# Patient Record
Sex: FEMALE | Race: WHITE | Employment: OTHER | URBAN - METROPOLITAN AREA
[De-identification: names, ages, dates, MRNs, and addresses within clinical notes are randomized per-mention and may not be internally consistent; named-entity substitution may affect disease eponyms.]

---

## 2022-07-21 ENCOUNTER — OFFICE VISIT (OUTPATIENT)
Dept: NEUROLOGY | Age: 77
End: 2022-07-21
Payer: MEDICARE

## 2022-07-21 VITALS
HEIGHT: 61 IN | DIASTOLIC BLOOD PRESSURE: 100 MMHG | BODY MASS INDEX: 29.64 KG/M2 | HEART RATE: 85 BPM | WEIGHT: 157 LBS | SYSTOLIC BLOOD PRESSURE: 176 MMHG | RESPIRATION RATE: 16 BRPM | OXYGEN SATURATION: 97 %

## 2022-07-21 DIAGNOSIS — R51.9 NEW ONSET OF HEADACHES AFTER AGE 50: Primary | ICD-10-CM

## 2022-07-21 PROCEDURE — G8427 DOCREV CUR MEDS BY ELIG CLIN: HCPCS | Performed by: PSYCHIATRY & NEUROLOGY

## 2022-07-21 PROCEDURE — G8400 PT W/DXA NO RESULTS DOC: HCPCS | Performed by: PSYCHIATRY & NEUROLOGY

## 2022-07-21 PROCEDURE — 99204 OFFICE O/P NEW MOD 45 MIN: CPT | Performed by: PSYCHIATRY & NEUROLOGY

## 2022-07-21 PROCEDURE — 1090F PRES/ABSN URINE INCON ASSESS: CPT | Performed by: PSYCHIATRY & NEUROLOGY

## 2022-07-21 PROCEDURE — 1101F PT FALLS ASSESS-DOCD LE1/YR: CPT | Performed by: PSYCHIATRY & NEUROLOGY

## 2022-07-21 PROCEDURE — G8536 NO DOC ELDER MAL SCRN: HCPCS | Performed by: PSYCHIATRY & NEUROLOGY

## 2022-07-21 PROCEDURE — G8432 DEP SCR NOT DOC, RNG: HCPCS | Performed by: PSYCHIATRY & NEUROLOGY

## 2022-07-21 PROCEDURE — 1123F ACP DISCUSS/DSCN MKR DOCD: CPT | Performed by: PSYCHIATRY & NEUROLOGY

## 2022-07-21 PROCEDURE — G8417 CALC BMI ABV UP PARAM F/U: HCPCS | Performed by: PSYCHIATRY & NEUROLOGY

## 2022-07-21 RX ORDER — ALENDRONATE SODIUM 70 MG/1
TABLET ORAL
COMMUNITY
Start: 2022-05-19

## 2022-07-21 RX ORDER — CITALOPRAM 20 MG/1
20 TABLET, FILM COATED ORAL DAILY
COMMUNITY

## 2022-07-21 RX ORDER — OXCARBAZEPINE 150 MG/1
150 TABLET, FILM COATED ORAL 2 TIMES DAILY
Qty: 60 TABLET | Refills: 2 | Status: SHIPPED | OUTPATIENT
Start: 2022-07-21 | End: 2022-09-20

## 2022-07-21 NOTE — PROGRESS NOTES
[facility] Neurology Clinics and [address] at [facility] Neurology Clinics at [address] [facility], [address] [facility] [address]  [phone] Office  [phone]           Referring: Carmelita Babinski, [address] [facility] [address]  [address]  [place],  [address]     Chief Complaint   Patient presents with    New Patient    Migraine     Shooting pains in back of head intermittently over the years 8-12 times per mo on avg lasting a few sec to 27min   81-year-old lady who presents today for initial neurologic consultation accompanied by her  for new onset head pain. She notes that she has had for about 8 months pain in the left side of the head localized in the occipital region. It will occur lasting 1 minute or 30 minutes. Described as a flushed burning type feeling. Does not radiate. No sore spot. No injury. No inciting factor. No associated symptoms. No weakness. No loss or alteration in consciousness. No change in vision. Feels a little numb at times. No tingling. She had a fall with hitting her head about 2 years ago but she hit the front of the head. She had the start maybe 2 years ago and had a head CT but it has started to worsen about 8 months ago. Now it is happening daily and can occur 2 or 3 times a day. Sometimes are so intense she has to lay down. She is tried over-the-counter medicine and it does not help. She has not had any advanced imaging.       Past Medical History:   Diagnosis Date    Age related osteoporosis     Essential hypertension     High cholesterol     Other ill-defined conditions(799.89)     EDEMA LEGS       Past Surgical History:   Procedure Laterality Date    HX HYSTERECTOMY      HX UROLOGICAL      BLADDER TACK 2 TIMES    NM BREAST SURGERY PROCEDURE UNLISTED      BI BREAST REDUCTION       Current Outpatient Medications   Medication Sig Dispense Refill alendronate (FOSAMAX) 70 mg tablet       citalopram (CELEXA) 20 mg tablet Take 20 mg by mouth in the morning.      ezetimibe (ZETIA) 10 mg tablet Take  by mouth.      calcium-vitamin D (OS-ENRIQUE +D3) 500 mg-200 unit per tablet Take 1 Tab by mouth two (2) times daily (with meals). No Known Allergies    Social History     Tobacco Use    Smoking status: Never    Smokeless tobacco: Never   Vaping Use    Vaping Use: Never used   Substance Use Topics    Alcohol use: No    Drug use: Never       Family History   Problem Relation Age of Onset    Stroke Mother     Heart Disease Mother     Heart Disease Father        Review of Systems  Pertinent positives and negatives as noted. Examination  Visit Vitals  BP (!) 160/100 (BP 1 Location: Left upper arm, BP Patient Position: Sitting, BP Cuff Size: Adult)   Pulse 85   Resp 16   Ht 5' 1\" (1.549 m)   Wt 71.2 kg (157 lb)   SpO2 97%   BMI 29.66 kg/m²     Neurologically, she is awake, alert, and oriented with normal speech and language. Her cognition is normal.    Intact cranial nerves 2-12. No nystagmus. Disk margins are flat bilaterally. She has normal bulk and tone. She has no abnormal movement. She has no pronation or drift. She generates full strength in the upper and lower extremities to direct confrontational testing. Reflexes are symmetrical in the upper and lower extremities bilaterally. No pathologic reflexes are elicited. Finger nose finger and rapid alternating movements are normal.  Steady gait.    She is tender over the left occipital notch and this is reminiscent of the pain of which she complains      Impression/Plan  29-year-old lady with new worsening left occipital pain and this is reminiscent of occipital neuralgia although she is not describing an electric-like pain and it does not radiate forward but she is tender over the occipital notch  Given the inconsistencies MRI of the brain and will get a carotid Doppler  We will give her a trial of Trileptal 150 mg twice daily  Follow-up in our [place] office in about 8 weeks which is closer to [place] in [place]          This note was created using voice recognition software. Despite editing, there may be syntax errors.

## 2022-07-21 NOTE — PATIENT INSTRUCTIONS
RESULT POLICY      If we have ordered testing for you, know that; \"NO NEWS IS GOOD NEWS! \"     It is our policy that we no longer call patients with results, nor do we  give test results over the phone. We schedule follow up appointments so that your results can be discussed in person. This allows you to address any questions you have regarding the results. If you choose to go to an imaging center outside of [facility], it is your responsibility to bring imaging report and disc to follow up appointment. If something of concern is revealed on your test, we will contact you to discuss the matter and if needed schedule a sooner follow up appointment. Additionally, results may be found by using the My Chart feature and one of our patient service representatives at the  can give you instructions on how to access this feature to utilize our electronic medical record system. Thank you for your understanding. [address] [facility] Neu[facility]   Statement to Patients  April 1, 2014      In an effort to ensure the large volume of patient prescription refills is processed in the most efficient and expeditious manner, we are asking our patients to assist us by calling your Pharmacy for all prescription refills, this will include also your  Mail Order Pharmacy. The pharmacy will contact our office electronically to continue the refill process. Please do not wait until the last minute to call your pharmacy. We need at least 48 hours (2days) to fill prescriptions. We also encourage you to call your pharmacy before going to  your prescription to make sure it is ready. With regard to controlled substance prescription refill requests (narcotic refills) that need to be picked up at our office, we ask your cooperation by providing us with at least 72 hours (3days) notice that you will need a refill.     We will not refill narcotic prescription refill requests after 4:00pm on any weekday, Monday through Thursday, or after 2:00pm on Fridays, or on the weekends. We encourage everyone to explore another way of getting your prescription refill request processed using [email or web address], our patient web portal through our electronic medical record system. [email or web address] is an efficient and effective way to communicate your medication request directly to the office and  downloadable as an jennifer on your smart phone . [email or web address] also features a review functionality that allows you to view your medication list as well as leave messages for your physician. Are you ready to get connected? If so please review the attatched instructions or speak to any of our staff to get you set up right away! Thank you so much for your cooperation. Should you have any questions please contact our Practice Administrator.

## 2022-08-09 ENCOUNTER — HOSPITAL ENCOUNTER (OUTPATIENT)
Dept: MRI IMAGING | Age: 77
Discharge: HOME | End: 2022-08-09
Attending: PSYCHIATRY & NEUROLOGY
Payer: MEDICARE

## 2022-08-09 DIAGNOSIS — R51.9 NEW ONSET OF HEADACHES AFTER AGE 50: ICD-10-CM

## 2022-08-09 PROCEDURE — 70551 MRI BRAIN STEM W/O DYE: CPT

## 2022-09-20 ENCOUNTER — OFFICE VISIT (OUTPATIENT)
Dept: NEUROLOGY | Age: 77
End: 2022-09-20
Payer: MEDICARE

## 2022-09-20 VITALS
OXYGEN SATURATION: 99 % | SYSTOLIC BLOOD PRESSURE: 138 MMHG | DIASTOLIC BLOOD PRESSURE: 78 MMHG | HEART RATE: 80 BPM | RESPIRATION RATE: 14 BRPM

## 2022-09-20 DIAGNOSIS — R51.9 NEW ONSET OF HEADACHES AFTER AGE 50: Primary | ICD-10-CM

## 2022-09-20 PROCEDURE — G8427 DOCREV CUR MEDS BY ELIG CLIN: HCPCS | Performed by: PSYCHIATRY & NEUROLOGY

## 2022-09-20 PROCEDURE — G8536 NO DOC ELDER MAL SCRN: HCPCS | Performed by: PSYCHIATRY & NEUROLOGY

## 2022-09-20 PROCEDURE — 1101F PT FALLS ASSESS-DOCD LE1/YR: CPT | Performed by: PSYCHIATRY & NEUROLOGY

## 2022-09-20 PROCEDURE — G8510 SCR DEP NEG, NO PLAN REQD: HCPCS | Performed by: PSYCHIATRY & NEUROLOGY

## 2022-09-20 PROCEDURE — G8417 CALC BMI ABV UP PARAM F/U: HCPCS | Performed by: PSYCHIATRY & NEUROLOGY

## 2022-09-20 PROCEDURE — 1123F ACP DISCUSS/DSCN MKR DOCD: CPT | Performed by: PSYCHIATRY & NEUROLOGY

## 2022-09-20 PROCEDURE — 99214 OFFICE O/P EST MOD 30 MIN: CPT | Performed by: PSYCHIATRY & NEUROLOGY

## 2022-09-20 PROCEDURE — G8400 PT W/DXA NO RESULTS DOC: HCPCS | Performed by: PSYCHIATRY & NEUROLOGY

## 2022-09-20 PROCEDURE — 1090F PRES/ABSN URINE INCON ASSESS: CPT | Performed by: PSYCHIATRY & NEUROLOGY

## 2022-09-20 RX ORDER — GABAPENTIN 300 MG/1
300 CAPSULE ORAL 2 TIMES DAILY
Qty: 60 CAPSULE | Refills: 3 | Status: SHIPPED | OUTPATIENT
Start: 2022-09-20

## 2022-09-20 NOTE — PROGRESS NOTES
Chief Complaint   Patient presents with    Follow-up     Patient returns after starting trileptal and she states the pain is not any better. Patient is accompanied by her .

## 2022-09-20 NOTE — PROGRESS NOTES
[facility] Neurology Clinics and [address] at [facility] Neurology Clinics at [address] [facility], [address] [facility] [address] [facility], [address] [place] [address]   [phone]              No chief complaint on file. Current Outpatient Medications   Medication Sig Dispense Refill    alendronate (FOSAMAX) 70 mg tablet       citalopram (CELEXA) 20 mg tablet Take 20 mg by mouth in the morning. OXcarbazepine (TRILEPTAL) 150 mg tablet Take 1 Tablet by mouth two (2) times a day. 60 Tablet 2    ezetimibe (ZETIA) 10 mg tablet Take  by mouth.      calcium-vitamin D (OS-ENRIQUE +D3) 500 mg-200 unit per tablet Take 1 Tab by mouth two (2) times daily (with meals). No Known Allergies  Social History     Tobacco Use    Smoking status: Never    Smokeless tobacco: Never   Vaping Use    Vaping Use: Never used   Substance Use Topics    Alcohol use: No    Drug use: Never     29-year-old lady following up from recent visit with me in July for left-sided occipital neuralgia type symptom  We did send her for MRI of the brain and that was personally reviewed and unremarkable  Carotid Doppler unremarkable  We started her on Trileptal for symptomatic relief  Today she reports the Trileptal did not help. She still getting sharp shooting pains in the left occipital region. She believes it to be worse. No focal complaints. Examination  There were no vitals taken for this visit. Visit Vitals  /78   Pulse 80   Resp 14   SpO2 99%     Pleasant engaging lady. She is awake alert and oriented. She has normal speech and language. Normal cognition.     Impression/Plan  Left-sided occipital pain reminiscent of trigeminal neuralgia with no improvement on Trileptal and with increasing symptoms  We will give her a trial of Neurontin 300 mg twice daily  She will let me know in about 6 weeks by either the patient portal or by telephone call how she is doing and then we will make adjustment and schedule follow-up      Lucinda Bowers MD        This note was created using voice recognition software. Despite editing, there may be syntax errors.

## 2022-12-05 DIAGNOSIS — R51.9 NEW ONSET OF HEADACHES AFTER AGE 50: ICD-10-CM

## 2022-12-05 NOTE — TELEPHONE ENCOUNTER
Patient calling about medication (Gabapentin)    Requested Prescriptions     Pending Prescriptions Disp Refills    gabapentin (NEURONTIN) 300 mg capsule 60 Capsule 3     Sig: Take 1 Capsule by mouth two (2) times a day. Max Daily Amount: 600 mg.      Please send refill request to:    [address]: [phone]

## 2022-12-08 RX ORDER — GABAPENTIN 300 MG/1
300 CAPSULE ORAL 2 TIMES DAILY
Qty: 180 CAPSULE | Refills: 0 | Status: SHIPPED | OUTPATIENT
Start: 2022-12-08

## 2023-03-23 DIAGNOSIS — R51.9 NEW ONSET OF HEADACHES AFTER AGE 50: ICD-10-CM

## 2023-03-23 RX ORDER — GABAPENTIN 300 MG/1
CAPSULE ORAL
Qty: 180 CAPSULE | Refills: 1 | Status: SHIPPED | OUTPATIENT
Start: 2023-03-23

## 2023-05-24 RX ORDER — GABAPENTIN 300 MG/1
CAPSULE ORAL
COMMUNITY
Start: 2023-03-23

## 2023-05-24 RX ORDER — ALENDRONATE SODIUM 70 MG/1
TABLET ORAL
COMMUNITY
Start: 2022-05-19

## 2023-05-24 RX ORDER — EZETIMIBE 10 MG/1
TABLET ORAL
COMMUNITY

## 2023-05-24 RX ORDER — CITALOPRAM 20 MG/1
20 TABLET ORAL DAILY
COMMUNITY

## 2023-05-24 RX ORDER — B-COMPLEX WITH VITAMIN C
1 TABLET ORAL 2 TIMES DAILY WITH MEALS
COMMUNITY

## 2023-12-11 ENCOUNTER — TELEPHONE (OUTPATIENT)
Age: 78
End: 2023-12-11

## 2023-12-11 NOTE — TELEPHONE ENCOUNTER
Pt has not been seen since 2022 with no upcoming appt.  LVM with pt that we can't fill this until she has upcoming appt.

## 2023-12-11 NOTE — TELEPHONE ENCOUNTER
Patient calling to refill gabapentin 300MG twice a day. She would like it sent to [facility] pharmacy. [address], [address], [place]     Pharmacy Phone: [phone]

## 2023-12-12 DIAGNOSIS — G44.89 OTHER HEADACHE SYNDROME: Primary | ICD-10-CM

## 2023-12-12 RX ORDER — GABAPENTIN 300 MG/1
CAPSULE ORAL
Qty: 180 CAPSULE | Refills: 1 | Status: SHIPPED | OUTPATIENT
Start: 2023-12-12 | End: 2024-03-11

## 2024-03-19 ENCOUNTER — OFFICE VISIT (OUTPATIENT)
Age: 79
End: 2024-03-19
Payer: MEDICARE

## 2024-03-19 VITALS
WEIGHT: 145 LBS | RESPIRATION RATE: 16 BRPM | DIASTOLIC BLOOD PRESSURE: 84 MMHG | BODY MASS INDEX: 27.38 KG/M2 | SYSTOLIC BLOOD PRESSURE: 136 MMHG | HEIGHT: 61 IN | OXYGEN SATURATION: 99 % | HEART RATE: 77 BPM

## 2024-03-19 DIAGNOSIS — G44.89 OTHER HEADACHE SYNDROME: ICD-10-CM

## 2024-03-19 PROCEDURE — 1123F ACP DISCUSS/DSCN MKR DOCD: CPT | Performed by: PSYCHIATRY & NEUROLOGY

## 2024-03-19 PROCEDURE — 1090F PRES/ABSN URINE INCON ASSESS: CPT | Performed by: PSYCHIATRY & NEUROLOGY

## 2024-03-19 PROCEDURE — 1036F TOBACCO NON-USER: CPT | Performed by: PSYCHIATRY & NEUROLOGY

## 2024-03-19 PROCEDURE — G8400 PT W/DXA NO RESULTS DOC: HCPCS | Performed by: PSYCHIATRY & NEUROLOGY

## 2024-03-19 PROCEDURE — G8484 FLU IMMUNIZE NO ADMIN: HCPCS | Performed by: PSYCHIATRY & NEUROLOGY

## 2024-03-19 PROCEDURE — 99213 OFFICE O/P EST LOW 20 MIN: CPT | Performed by: PSYCHIATRY & NEUROLOGY

## 2024-03-19 PROCEDURE — G8427 DOCREV CUR MEDS BY ELIG CLIN: HCPCS | Performed by: PSYCHIATRY & NEUROLOGY

## 2024-03-19 PROCEDURE — G8419 CALC BMI OUT NRM PARAM NOF/U: HCPCS | Performed by: PSYCHIATRY & NEUROLOGY

## 2024-03-19 RX ORDER — GABAPENTIN 300 MG/1
CAPSULE ORAL
Qty: 180 CAPSULE | Refills: 1 | Status: SHIPPED | OUTPATIENT
Start: 2024-03-19 | End: 2027-03-19

## 2024-03-19 ASSESSMENT — PATIENT HEALTH QUESTIONNAIRE - PHQ9
SUM OF ALL RESPONSES TO PHQ QUESTIONS 1-9: 1
SUM OF ALL RESPONSES TO PHQ9 QUESTIONS 1 & 2: 1
SUM OF ALL RESPONSES TO PHQ QUESTIONS 1-9: 1
SUM OF ALL RESPONSES TO PHQ QUESTIONS 1-9: 1
1. LITTLE INTEREST OR PLEASURE IN DOING THINGS: NOT AT ALL
SUM OF ALL RESPONSES TO PHQ QUESTIONS 1-9: 1
2. FEELING DOWN, DEPRESSED OR HOPELESS: SEVERAL DAYS

## 2024-03-19 NOTE — PROGRESS NOTES
pronation or drift. Resists fully in all 4 extrems.  DTR symmetric in all 4 extremities.  No ataxia. Steady gait.      Impression/Plan  Left-sided occipital neuralgia tolerable with Neurontin 300 mg twice daily  Continue same Neurontin  As long as she does well follow-up 1 year    Danielle Shin MD        This note was created using voice recognition software. Despite editing, there may be syntax errors.

## 2025-01-14 DIAGNOSIS — G44.89 OTHER HEADACHE SYNDROME: ICD-10-CM

## 2025-01-14 RX ORDER — GABAPENTIN 300 MG/1
CAPSULE ORAL
Qty: 180 CAPSULE | Refills: 1 | Status: SHIPPED | OUTPATIENT
Start: 2025-01-14 | End: 2027-01-13

## 2025-01-15 RX ORDER — GABAPENTIN 300 MG/1
CAPSULE ORAL
Qty: 180 CAPSULE | Refills: 0 | Status: SHIPPED | OUTPATIENT
Start: 2025-01-15 | End: 2028-01-14

## 2025-03-25 ENCOUNTER — OFFICE VISIT (OUTPATIENT)
Age: 80
End: 2025-03-25
Payer: MEDICARE

## 2025-03-25 VITALS
HEART RATE: 80 BPM | DIASTOLIC BLOOD PRESSURE: 93 MMHG | OXYGEN SATURATION: 96 % | RESPIRATION RATE: 16 BRPM | SYSTOLIC BLOOD PRESSURE: 171 MMHG

## 2025-03-25 DIAGNOSIS — G44.89 OTHER HEADACHE SYNDROME: ICD-10-CM

## 2025-03-25 PROCEDURE — G8428 CUR MEDS NOT DOCUMENT: HCPCS | Performed by: PSYCHIATRY & NEUROLOGY

## 2025-03-25 PROCEDURE — 1090F PRES/ABSN URINE INCON ASSESS: CPT | Performed by: PSYCHIATRY & NEUROLOGY

## 2025-03-25 PROCEDURE — 1123F ACP DISCUSS/DSCN MKR DOCD: CPT | Performed by: PSYCHIATRY & NEUROLOGY

## 2025-03-25 PROCEDURE — 1036F TOBACCO NON-USER: CPT | Performed by: PSYCHIATRY & NEUROLOGY

## 2025-03-25 PROCEDURE — G8400 PT W/DXA NO RESULTS DOC: HCPCS | Performed by: PSYCHIATRY & NEUROLOGY

## 2025-03-25 PROCEDURE — G8421 BMI NOT CALCULATED: HCPCS | Performed by: PSYCHIATRY & NEUROLOGY

## 2025-03-25 PROCEDURE — 1126F AMNT PAIN NOTED NONE PRSNT: CPT | Performed by: PSYCHIATRY & NEUROLOGY

## 2025-03-25 PROCEDURE — 99213 OFFICE O/P EST LOW 20 MIN: CPT | Performed by: PSYCHIATRY & NEUROLOGY

## 2025-03-25 RX ORDER — GABAPENTIN 300 MG/1
CAPSULE ORAL
Qty: 180 CAPSULE | Refills: 1 | Status: SHIPPED | OUTPATIENT
Start: 2025-03-25 | End: 2028-03-23

## 2025-03-25 NOTE — PROGRESS NOTES
[facility] Neurology Clinics and Neuro[facility] at [facility] Neurology Clinics at [address] [place] [address] [address] [facility] [address] [place] [address] [phone]              Chief Complaint   Patient presents with    Left-sided occipital neuralgia      Annual f/ up // Neurontin 300 mg twice daily - only taking once daily     Current Outpatient Medications   Medication Sig Dispense Refill    gabapentin (NEURONTIN) 300 MG capsule TAKE 1 CAPSULE TWICE DAILY (MAX DAILY AMOUNT: 600 MG) (Patient taking differently: Take 1 capsule by mouth daily. TAKE 1 CAPSULE TWICE DAILY (MAX DAILY AMOUNT: 600 MG)) 180 capsule 0    alendronate (FOSAMAX) 70 MG tablet every 7 days ceived the following from [facility]: Outside name: alendronate (FOSAMAX) 70 mg tablet      Calcium Carbonate-Vitamin D (OYSTER SHELL CALCIUM/D) 500-5 MG-MCG TABS Take 1 tablet by mouth 2 times daily (with meals)      citalopram (CELEXA) 20 MG tablet Take 1 tablet by mouth daily      ezetimibe (ZETIA) 10 MG tablet Take 1 tablet by mouth daily       No current facility-administered medications for this visit.      No Known Allergies  Social History     Tobacco Use    Smoking status: Never    Smokeless tobacco: Never   Vaping Use    Vaping status: Never Used   Substance Use Topics    Alcohol use: No    Drug use: Never       History of Present Illness  79-year-old lady following up today for left sided occipital neuralgia.  She was maintained on Neurontin 300 mg twice daily.  Today she reports her symptoms have been well-controlled and she is down to 1 Neurontin daily.  She has had a couple of bouts of pain but otherwise she is very happy with how she is doing.      Examination  BP (!) 164/93 (BP Site: Left Upper Arm, Patient Position: Sitting)   Pulse 80   Resp 16   SpO2 96%     Awake, alert and oriented.  No icterus.  CN intact 2-12 without nystagmus.  No

## 2025-03-25 NOTE — PROGRESS NOTES
BP elevated x2.  Advised pt to monitor BP at home for the next couple of weeks.  Discuss BP with pcp if it continues to run >140/90.   CHECK ONBASE FOR SCAN

## 2025-04-24 ENCOUNTER — HOSPITAL ENCOUNTER (OUTPATIENT)
Age: 80
Setting detail: SPECIMEN
Discharge: HOME | End: 2025-04-27
Payer: MEDICARE

## 2025-04-24 ENCOUNTER — OFFICE VISIT (OUTPATIENT)
Age: 80
End: 2025-04-24
Payer: MEDICARE

## 2025-04-24 ENCOUNTER — HOSPITAL ENCOUNTER (OUTPATIENT)
Age: 80
Discharge: HOME | End: 2025-04-27
Payer: MEDICARE

## 2025-04-24 VITALS — BODY MASS INDEX: 28.32 KG/M2 | HEIGHT: 61 IN | WEIGHT: 150 LBS

## 2025-04-24 DIAGNOSIS — E78.00 HIGH CHOLESTEROL: ICD-10-CM

## 2025-04-24 DIAGNOSIS — M17.12 OSTEOARTHRITIS OF LEFT KNEE, UNSPECIFIED OSTEOARTHRITIS TYPE: ICD-10-CM

## 2025-04-24 DIAGNOSIS — M25.562 LEFT KNEE PAIN, UNSPECIFIED CHRONICITY: Primary | ICD-10-CM

## 2025-04-24 LAB
ANION GAP SERPL CALC-SCNC: 4 MMOL/L (ref 3–18)
BUN SERPL-MCNC: 21 MG/DL (ref 7–18)
BUN/CREAT SERPL: 27 (ref 12–20)
CA-I BLD-MCNC: 9.5 MG/DL (ref 8.5–10.1)
CHLORIDE SERPL-SCNC: 103 MMOL/L (ref 100–111)
CO2 SERPL-SCNC: 32 MMOL/L (ref 21–32)
CREAT SERPL-MCNC: 0.78 MG/DL (ref 0.6–1.3)
ERYTHROCYTE [DISTWIDTH] IN BLOOD BY AUTOMATED COUNT: 12.2 % (ref 11.6–14.5)
GLUCOSE SERPL-MCNC: 93 MG/DL (ref 74–99)
HCT VFR BLD AUTO: 36.9 % (ref 35–45)
HGB BLD-MCNC: 11.8 G/DL (ref 12–16)
MCH RBC QN AUTO: 31.3 PG (ref 24–34)
MCHC RBC AUTO-ENTMCNC: 32 G/DL (ref 31–37)
MCV RBC AUTO: 97.9 FL (ref 78–100)
NRBC # BLD: 0 K/UL (ref 0–0.01)
NRBC BLD-RTO: 0 PER 100 WBC
PLATELET # BLD AUTO: 265 K/UL (ref 135–420)
PMV BLD AUTO: 9.3 FL (ref 9.2–11.8)
POTASSIUM SERPL-SCNC: 3.9 MMOL/L (ref 3.5–5.5)
RBC # BLD AUTO: 3.77 M/UL (ref 4.2–5.3)
SODIUM SERPL-SCNC: 139 MMOL/L (ref 136–145)
WBC # BLD AUTO: 8 K/UL (ref 4.6–13.2)

## 2025-04-24 PROCEDURE — 85027 COMPLETE CBC AUTOMATED: CPT

## 2025-04-24 PROCEDURE — 36415 COLL VENOUS BLD VENIPUNCTURE: CPT

## 2025-04-24 PROCEDURE — G8419 CALC BMI OUT NRM PARAM NOF/U: HCPCS | Performed by: ORTHOPAEDIC SURGERY

## 2025-04-24 PROCEDURE — G8427 DOCREV CUR MEDS BY ELIG CLIN: HCPCS | Performed by: ORTHOPAEDIC SURGERY

## 2025-04-24 PROCEDURE — 71046 X-RAY EXAM CHEST 2 VIEWS: CPT

## 2025-04-24 PROCEDURE — 1123F ACP DISCUSS/DSCN MKR DOCD: CPT | Performed by: ORTHOPAEDIC SURGERY

## 2025-04-24 PROCEDURE — 1160F RVW MEDS BY RX/DR IN RCRD: CPT | Performed by: ORTHOPAEDIC SURGERY

## 2025-04-24 PROCEDURE — 1159F MED LIST DOCD IN RCRD: CPT | Performed by: ORTHOPAEDIC SURGERY

## 2025-04-24 PROCEDURE — 80048 BASIC METABOLIC PNL TOTAL CA: CPT

## 2025-04-24 PROCEDURE — 1090F PRES/ABSN URINE INCON ASSESS: CPT | Performed by: ORTHOPAEDIC SURGERY

## 2025-04-24 PROCEDURE — 93005 ELECTROCARDIOGRAM TRACING: CPT

## 2025-04-24 PROCEDURE — G8400 PT W/DXA NO RESULTS DOC: HCPCS | Performed by: ORTHOPAEDIC SURGERY

## 2025-04-24 PROCEDURE — 1036F TOBACCO NON-USER: CPT | Performed by: ORTHOPAEDIC SURGERY

## 2025-04-24 PROCEDURE — 99204 OFFICE O/P NEW MOD 45 MIN: CPT | Performed by: ORTHOPAEDIC SURGERY

## 2025-04-24 PROCEDURE — 1125F AMNT PAIN NOTED PAIN PRSNT: CPT | Performed by: ORTHOPAEDIC SURGERY

## 2025-04-24 RX ORDER — FENOFIBRATE 134 MG/1
CAPSULE ORAL
COMMUNITY

## 2025-04-24 RX ORDER — DENOSUMAB 60 MG/ML
INJECTION SUBCUTANEOUS
COMMUNITY

## 2025-04-24 RX ORDER — ATORVASTATIN CALCIUM 10 MG/1
TABLET, FILM COATED ORAL
COMMUNITY

## 2025-04-24 RX ORDER — CIPROFLOXACIN 500 MG/1
TABLET, FILM COATED ORAL
COMMUNITY
Start: 2024-11-04

## 2025-04-24 RX ORDER — NITROFURANTOIN 25; 75 MG/1; MG/1
CAPSULE ORAL
COMMUNITY
Start: 2024-10-24

## 2025-04-24 RX ORDER — BUSPIRONE HYDROCHLORIDE 15 MG/1
TABLET ORAL
COMMUNITY
Start: 2024-07-17

## 2025-04-24 RX ORDER — METHYLDOPA/HYDROCHLOROTHIAZIDE 250MG-15MG
TABLET ORAL
COMMUNITY

## 2025-04-24 NOTE — PROGRESS NOTES
received. Diagnosed with arthritis by an orthopedic specialist. No history of blood clots. Consulted a cardiologist once, no ongoing cardiac issues.    SOCIAL HISTORY  - Marital Status:     Results  Imaging   - X-ray of the left knee 3 views: Severe arthritis, bone on bone arthritis    Assessment & Plan  1. Severe osteoarthritis of the left knee:  X-rays show severe bone-on-bone arthritis. Significant pain and worsening symptoms over 6 months.    Discussed Jiffy knee replacement benefits, including quicker rehabilitation. Surgery duration: ~1 hour, same-day discharge. Local physical therapy. Risks: bleeding, infection, blood clots. Placed on cancellation list for expedited surgery. Preoperative clearance (blood work, chest x-ray, EKG) today at the hospital.    Follow-up: Placement on cancellation list for expedited surgery.    As part of continued conservative pain management options the patient was advised to utilize Tylenol or OTC NSAIDS as long as it is not medically contraindicated.   Return to Office:    Follow-up and Dispositions    Return for SCHEDULE FOR SURGERY.        Scribed by Abdulaizz James MD as dictated by Abdulaziz James MD.  Documentation, performed by, True and Accepted Abdulaziz James MD    The patient (or guardian, if applicable) and other individuals in attendance with the patient were advised that Artificial Intelligence will be utilized during this visit to record, process the conversation to generate a clinical note, and support improvement of the AI technology. The patient (or guardian, if applicable) and other individuals in attendance at the appointment consented to the use of AI, including the recording.

## 2025-04-25 LAB
BACTERIA SPEC CULT: NORMAL
BACTERIA SPEC CULT: NORMAL
EKG ATRIAL RATE: 85 BPM
EKG DIAGNOSIS: NORMAL
EKG P AXIS: 53 DEGREES
EKG P-R INTERVAL: 168 MS
EKG Q-T INTERVAL: 372 MS
EKG QRS DURATION: 88 MS
EKG QTC CALCULATION (BAZETT): 442 MS
EKG R AXIS: 31 DEGREES
EKG T AXIS: 61 DEGREES
EKG VENTRICULAR RATE: 85 BPM
Lab: NORMAL

## 2025-05-28 DIAGNOSIS — M17.12 OSTEOARTHRITIS OF LEFT KNEE, UNSPECIFIED OSTEOARTHRITIS TYPE: Primary | ICD-10-CM

## 2025-06-04 DIAGNOSIS — Z96.652 STATUS POST TOTAL LEFT KNEE REPLACEMENT: Primary | ICD-10-CM

## 2025-06-12 ENCOUNTER — OFFICE VISIT (OUTPATIENT)
Age: 80
End: 2025-06-12
Payer: MEDICARE

## 2025-06-12 DIAGNOSIS — M17.12 OSTEOARTHRITIS OF LEFT KNEE, UNSPECIFIED OSTEOARTHRITIS TYPE: Primary | ICD-10-CM

## 2025-06-12 DIAGNOSIS — E78.00 HIGH CHOLESTEROL: ICD-10-CM

## 2025-06-12 PROCEDURE — 99214 OFFICE O/P EST MOD 30 MIN: CPT | Performed by: ORTHOPAEDIC SURGERY

## 2025-06-12 PROCEDURE — 1090F PRES/ABSN URINE INCON ASSESS: CPT | Performed by: ORTHOPAEDIC SURGERY

## 2025-06-12 PROCEDURE — G8419 CALC BMI OUT NRM PARAM NOF/U: HCPCS | Performed by: ORTHOPAEDIC SURGERY

## 2025-06-12 PROCEDURE — 1036F TOBACCO NON-USER: CPT | Performed by: ORTHOPAEDIC SURGERY

## 2025-06-12 PROCEDURE — 1123F ACP DISCUSS/DSCN MKR DOCD: CPT | Performed by: ORTHOPAEDIC SURGERY

## 2025-06-12 PROCEDURE — G8400 PT W/DXA NO RESULTS DOC: HCPCS | Performed by: ORTHOPAEDIC SURGERY

## 2025-06-12 PROCEDURE — G8427 DOCREV CUR MEDS BY ELIG CLIN: HCPCS | Performed by: ORTHOPAEDIC SURGERY

## 2025-06-12 RX ORDER — ASPIRIN 325 MG
325 TABLET ORAL 2 TIMES DAILY
Qty: 60 TABLET | Refills: 0 | Status: SHIPPED | OUTPATIENT
Start: 2025-06-12

## 2025-06-12 RX ORDER — CEPHALEXIN 500 MG/1
500 CAPSULE ORAL EVERY 8 HOURS
Qty: 21 CAPSULE | Refills: 0 | Status: SHIPPED | OUTPATIENT
Start: 2025-06-12 | End: 2025-06-19

## 2025-06-12 RX ORDER — ONDANSETRON 8 MG/1
8 TABLET, ORALLY DISINTEGRATING ORAL EVERY 8 HOURS PRN
Qty: 20 TABLET | Refills: 0 | Status: SHIPPED | OUTPATIENT
Start: 2025-06-12

## 2025-06-12 RX ORDER — OXYCODONE AND ACETAMINOPHEN 5; 325 MG/1; MG/1
1 TABLET ORAL
Qty: 30 TABLET | Refills: 0 | Status: SHIPPED | OUTPATIENT
Start: 2025-06-12 | End: 2025-06-19

## 2025-06-12 NOTE — PROGRESS NOTES
Name: Lisandra Gil    : 1945     [facility] ORTHOPAEDICS AND SPORTS MEDICINE  [address] [facility] [address], [address]  [facility] [address]  Dept: [phone]  Dept Fax: [phone]   Chief Complaint   Patient presents with    Pre-op Exam    Knee Pain     There were no vitals taken for this visit.   No Known Allergies  Current Outpatient Medications   Medication Sig Dispense Refill    oxyCODONE-acetaminophen (PERCOCET) 5-325 MG per tablet Take 1 tablet by mouth every 4-6 hours as needed for Pain for up to 7 days. Do not start taking pain medication until after surgery! Max Daily Amount: 6 tablets 30 tablet 0    ondansetron (ZOFRAN-ODT) 8 MG TBDP disintegrating tablet Take 1 tablet by mouth every 8 hours as needed for Nausea or Vomiting TAKE EVERY 8 HOURS PRN NAUSEA / Do Not start until after surgery / dispense 20 tabs 20 tablet 0    aspirin 325 MG tablet Take 1 tablet by mouth in the morning and at bedtime DO NOT START UNTIL AFTER SURGERY / NO 90 DAY RX WILL BE PROVIDED AS PATIENT WILL ONLY TAKE THIS 30 DAYS POST SURGERY 60 tablet 0    cephALEXin (KEFLEX) 500 MG capsule Take 1 capsule by mouth every 8 (eight) hours for 7 days DO NOT START MEDICATION UNTIL AFTER SURGERY 21 capsule 0    atorvastatin (LIPITOR) 10 MG tablet 10 MG  , TAKE 1 TABLET ONE TIME DAILY      busPIRone (BUSPAR) 15 MG tablet Take by mouth      ciprofloxacin (CIPRO) 500 MG tablet Take by mouth      denosumab (PROLIA) 60 MG/ML SOSY SC injection Inject into the skin      esomeprazole (NEXIUM) 20 MG delayed release capsule Take by mouth      fenofibrate micronized (LOFIBRA) 134 MG capsule Take by mouth      nitrofurantoin, macrocrystal-monohydrate, (MACROBID) 100 MG capsule Take by mouth      Probiotic CHEW Take by mouth      gabapentin (NEURONTIN) 300 MG capsule TAKE 1 CAPSULE TWICE DAILY (MAX DAILY AMOUNT: 600 MG) 180 capsule 1    citalopram (CELEXA) 20 MG tablet Take 1 tablet by mouth daily

## 2025-07-02 ENCOUNTER — HOSPITAL ENCOUNTER (OUTPATIENT)
Age: 80
Discharge: HOME | End: 2025-07-05
Payer: MEDICARE

## 2025-07-02 DIAGNOSIS — M17.12 ARTHRITIS OF LEFT KNEE: ICD-10-CM

## 2025-07-02 PROCEDURE — 73560 X-RAY EXAM OF KNEE 1 OR 2: CPT

## 2025-07-09 ENCOUNTER — TELEMEDICINE (OUTPATIENT)
Age: 80
End: 2025-07-09

## 2025-07-09 DIAGNOSIS — Z96.652 STATUS POST TOTAL KNEE REPLACEMENT, LEFT: Primary | ICD-10-CM

## 2025-07-09 DIAGNOSIS — M25.562 LEFT KNEE PAIN, UNSPECIFIED CHRONICITY: ICD-10-CM

## 2025-07-09 PROCEDURE — 99024 POSTOP FOLLOW-UP VISIT: CPT

## 2025-07-09 NOTE — PROGRESS NOTES
Name: Lisandra Gil (:  1945) is a Established patient, presenting virtually for evaluation of the following:      [facility] ORTHOPAEDICS AND SPORTS MEDICINE  [address] [place] [address] A  [facility] [address]  Dept: [phone]  Dept Fax: [phone]   Chief Complaint   Patient presents with    Post-Op Check     Left tkr     There were no vitals taken for this visit.   No Known Allergies  Current Outpatient Medications   Medication Sig Dispense Refill    ondansetron (ZOFRAN-ODT) 8 MG TBDP disintegrating tablet Take 1 tablet by mouth every 8 hours as needed for Nausea or Vomiting TAKE EVERY 8 HOURS PRN NAUSEA / Do Not start until after surgery / dispense 20 tabs 20 tablet 0    aspirin 325 MG tablet Take 1 tablet by mouth in the morning and at bedtime DO NOT START UNTIL AFTER SURGERY / NO 90 DAY RX WILL BE PROVIDED AS PATIENT WILL ONLY TAKE THIS 30 DAYS POST SURGERY 60 tablet 0    atorvastatin (LIPITOR) 10 MG tablet 10 MG  , TAKE 1 TABLET ONE TIME DAILY      busPIRone (BUSPAR) 15 MG tablet Take by mouth      ciprofloxacin (CIPRO) 500 MG tablet Take by mouth      denosumab (PROLIA) 60 MG/ML SOSY SC injection Inject into the skin      esomeprazole (NEXIUM) 20 MG delayed release capsule Take by mouth      fenofibrate micronized (LOFIBRA) 134 MG capsule Take by mouth      nitrofurantoin, macrocrystal-monohydrate, (MACROBID) 100 MG capsule Take by mouth      Probiotic CHEW Take by mouth      gabapentin (NEURONTIN) 300 MG capsule TAKE 1 CAPSULE TWICE DAILY (MAX DAILY AMOUNT: 600 MG) 180 capsule 1    citalopram (CELEXA) 20 MG tablet Take 1 tablet by mouth daily      ezetimibe (ZETIA) 10 MG tablet Take 1 tablet by mouth daily       No current facility-administered medications for this visit.       There is no problem list on file for this patient.     Family History   Problem Relation Age of Onset    Heart Disease Father     Stroke Mother     Heart Disease

## 2025-07-30 ENCOUNTER — TELEMEDICINE (OUTPATIENT)
Age: 80
End: 2025-07-30

## 2025-07-30 DIAGNOSIS — Z96.652 STATUS POST TOTAL KNEE REPLACEMENT, LEFT: Primary | ICD-10-CM

## 2025-07-30 DIAGNOSIS — M25.562 LEFT KNEE PAIN, UNSPECIFIED CHRONICITY: ICD-10-CM

## 2025-07-30 PROCEDURE — 99024 POSTOP FOLLOW-UP VISIT: CPT

## 2025-07-30 NOTE — PROGRESS NOTES
Mother        Past Surgical History:   Procedure Laterality Date    ANKLE FRACTURE SURGERY  Dec 2014    BREAST SURGERY      BI BREAST REDUCTION    HYSTERECTOMY (CERVIX STATUS UNKNOWN)      UROLOGICAL SURGERY      BLADDER TACK 2 TIMES      Past Medical History:   Diagnosis Date    Age related osteoporosis     Essential hypertension     High cholesterol     Other ill-defined conditions(799.89)     EDEMA LEGS        I have reviewed and agree with PFSH and ROS and intake form in chart and the record furthermore I have reviewed prior medical record(s) regarding this patients care during this appointment.     Review of Systems:   Patient is a pleasant appearing individual, appropriately dressed, well hydrated, well nourished, who is alert, appropriately oriented for age, and in no acute distress with a normal gait and normal affect who does not appear to be in any significant pain.       Physical Exam:  Left knee - Neurovascularly intact with good cap refill,  extensor mechanism intact, full range of motion and full strength, well healed incision noted, no swelling, no erythema, no instability.     Right knee - Decrease range of motion with flexion, Some crepitation, Grossly neurovascularly intact, Good cap refill, No skin lesion, Moderate swelling, No gross instability, Some quadriceps weakness        Encounter Diagnoses   Name Primary?    Status post total knee replacement, left Yes    Left knee pain, unspecified chronicity       History of Present Illness  The patient is a 80-year-old female who presents via virtual visit status post left total knee replacement on 07/02/2025.    She reports an improvement in her condition, attributing it to the therapy session she attended yesterday. The therapist did not measure her range of motion but indicated that it was satisfactory. She has been diligent with her home exercises, performing them three times daily. She has not required the use of a walker or cane for the past two